# Patient Record
Sex: MALE | Race: WHITE | Employment: OTHER | URBAN - METROPOLITAN AREA
[De-identification: names, ages, dates, MRNs, and addresses within clinical notes are randomized per-mention and may not be internally consistent; named-entity substitution may affect disease eponyms.]

---

## 2017-02-03 ENCOUNTER — TELEPHONE (OUTPATIENT)
Dept: CARDIOLOGY CLINIC | Age: 62
End: 2017-02-03

## 2017-02-03 DIAGNOSIS — Z88.9 H/O SEASONAL ALLERGIES: Primary | ICD-10-CM

## 2017-02-03 RX ORDER — MONTELUKAST SODIUM 10 MG/1
TABLET ORAL
Qty: 30 TAB | Refills: 12 | Status: SHIPPED | OUTPATIENT
Start: 2017-02-03 | End: 2021-03-31

## 2017-02-03 NOTE — TELEPHONE ENCOUNTER
Curtis Beckett, NP   You 16 minutes ago (4:23 PM)                 Next week (Routing comment)         Verified patient with two identifiers. Spoke with Beverley Gomez at [employer]  informed him per Nereida Menjivar NP patient will start Singulair next week. He verbalized understanding.

## 2017-02-03 NOTE — TELEPHONE ENCOUNTER
[facility] called  wanting to clarify when patient is to start his Singulair. Please advise.

## 2017-07-09 ENCOUNTER — HOSPITAL ENCOUNTER (EMERGENCY)
Age: 62
Discharge: HOME | End: 2017-07-09
Attending: FAMILY MEDICINE

## 2017-07-09 VITALS
DIASTOLIC BLOOD PRESSURE: 79 MMHG | OXYGEN SATURATION: 95 % | TEMPERATURE: 98.9 F | SYSTOLIC BLOOD PRESSURE: 129 MMHG | RESPIRATION RATE: 20 BRPM | HEART RATE: 75 BPM | HEIGHT: 72 IN

## 2017-07-09 DIAGNOSIS — S05.02XA CORNEAL ABRASION, LEFT, INITIAL ENCOUNTER: Primary | ICD-10-CM

## 2017-07-09 RX ORDER — ERYTHROMYCIN 5 MG/G
OINTMENT OPHTHALMIC
Qty: 3.5 G | Refills: 0 | Status: SHIPPED | OUTPATIENT
Start: 2017-07-09 | End: 2017-07-16

## 2017-07-09 NOTE — DISCHARGE INSTRUCTIONS
Corneal Scratches: Care Instructions  Your Care Instructions    The cornea is the clear surface that covers the front of the eye. When a speck of dirt, a wood chip, an insect, or another object flies into your eye, it can cause a painful scratch on the cornea. Wearing contact lenses too long or rubbing your eyes can also scratch the cornea. Small scratches usually heal in a day or two. Deeper scratches may take longer. If you have had a foreign object removed from your eye or you have a corneal scratch, you will need to watch for infection and vision problems while your eye heals. Follow-up care is a key part of your treatment and safety. Be sure to make and go to all appointments, and call your doctor if you are having problems. It's also a good idea to know your test results and keep a list of the medicines you take. How can you care for yourself at home? · The doctor probably used a medicine during your exam to numb your eye. When it wears off in 30 to 60 minutes, your eye pain may come back. Take pain medicines exactly as directed. ¨ If the doctor gave you a prescription medicine for pain, take it as prescribed. ¨ If you are not taking a prescription pain medicine, ask your doctor if you can take an over-the-counter medicine. ¨ Do not take two or more pain medicines at the same time unless the doctor told you to. Many pain medicines have acetaminophen, which is Tylenol. Too much acetaminophen (Tylenol) can be harmful. · Do not rub your injured eye. Rubbing can make it worse. · Use the prescribed eyedrops or ointment as directed. Be sure the dropper or bottle tip is clean. To put in eyedrops or ointment:  ¨ Tilt your head back, and pull your lower eyelid down with one finger. ¨ Drop or squirt the medicine inside the lower lid. ¨ Close your eye for 30 to 60 seconds to let the drops or ointment move around. ¨ Do not touch the ointment or dropper tip to your eyelashes or any other surface.   · Do not use your contact lens in your hurt eye until your doctor says you can. Also, do not wear eye makeup until your eye has healed. · Do not drive if you have blurred vision. · Bright light may hurt. Sunglasses can help. · To prevent eye injuries in the future, wear safety glasses or goggles when you work with machines or tools, mow the lawn, or ride a bike or motorcycle. When should you call for help? Call your doctor now or seek immediate medical care if:  · You have signs of an eye infection, such as:  ¨ Pus or thick discharge coming from the eye. ¨ Redness or swelling around the eye. ¨ A fever. · You have new or worse eye pain. · You have vision changes. · It feels like there is something in your eye. · Light hurts your eye. Watch closely for changes in your health, and be sure to contact your doctor if:  · You do not get better as expected. Where can you learn more? Go to http://darci-ja.info/. Enter V679 in the search box to learn more about \"Corneal Scratches: Care Instructions. \"  Current as of: March 20, 2017  Content Version: 11.3  © 1651-6424 [employer]. Care instructions adapted under license by [employer] (which disclaims liability or warranty for this information). If you have questions about a medical condition or this instruction, always ask your healthcare professional. [place] [address] any warranty or liability for your use of this information.

## 2017-07-11 NOTE — UC PROVIDER NOTE
Patient is a 57 y.o. male presenting with eye injury. The history is provided by the patient. Eye Injury    This is a new problem. The current episode started yesterday. The left eye is affected. History of trauma: Using a weed eater without using gogles. Associated symptoms include eye redness and pain. Pertinent negatives include no blurred vision, no decreased vision and no fever. He has tried nothing for the symptoms. Past Medical History:   Diagnosis Date    Adverse effect of anesthesia     sleep apnea no cpap machine     Arthritis     joints    Diabetes (HCC) 1/2013    A1c 7    GERD (gastroesophageal reflux disease)     Hypertension     Ill-defined condition     Hiatial Hernia    Right bundle branch block     Unspecified sleep apnea     unable to tolerate cpap        Past Surgical History:   Procedure Laterality Date    HX CATARACT REMOVAL Bilateral     with lens implants    HX LUMBAR LAMINECTOMY      HX ORTHOPAEDIC  2/13/13    LEFT TOTAL KNEE ARTHROPLASTY         Family History   Problem Relation Age of Onset    Diabetes Mother     Cancer Mother      lymphoma    Diabetes Brother     Cancer Brother      throat    Lung Disease Father      emphysema        Social History     Social History    Marital status:      Spouse name: N/A    Number of children: N/A    Years of education: N/A     Occupational History     Other     Social History Main Topics    Smoking status: Former Smoker     Packs/day: 0.50     Years: 20.00     Types: Cigarettes     Quit date: 1/22/2011    Smokeless tobacco: Never Used    Alcohol use No    Drug use: Yes     Special: Prescription, OTC    Sexual activity: Not on file     Other Topics Concern    Not on file     Social History Narrative                ALLERGIES: Review of patient's allergies indicates no known allergies. Review of Systems   Constitutional: Negative for chills and fever. HENT: Negative for congestion.     Eyes: Positive for pain and redness. Negative for blurred vision. Vitals:    07/09/17 0839 07/09/17 0840   BP:  129/79   Pulse:  75   Resp:  20   Temp:  98.9 °F (37.2 °C)   SpO2:  95%   Height: 6' (1.829 m)        Physical Exam   Constitutional: He is oriented to person, place, and time. He appears well-developed and well-nourished. Eyes: Conjunctivae and EOM are normal.   Small abrasion in left cornea 6:00 position   Pulmonary/Chest: Effort normal.   Neurological: He is alert and oriented to person, place, and time. Psychiatric: He has a normal mood and affect. His behavior is normal. Judgment and thought content normal.   Nursing note and vitals reviewed. MDM     Differential Diagnosis; Clinical Impression; Plan:     CLINICAL IMPRESSION:  Corneal abrasion, left, initial encounter  (primary encounter diagnosis)    Plan:  1. Erythromycin ophthalmic  2.   3.   Risk of Significant Complications, Morbidity, and/or Mortality:   Presenting problems: Moderate  Diagnostic procedures: Moderate  Management options:   Moderate  Progress:   Patient progress:  Stable      Procedures

## 2019-01-22 ENCOUNTER — HOSPITAL ENCOUNTER (OUTPATIENT)
Dept: MRI IMAGING | Age: 64
Discharge: HOME | End: 2019-01-22
Attending: ORTHOPAEDIC SURGERY

## 2019-01-22 DIAGNOSIS — M75.121 COMPLETE TEAR OF RIGHT ROTATOR CUFF: ICD-10-CM

## 2019-02-05 ENCOUNTER — HOSPITAL ENCOUNTER (OUTPATIENT)
Dept: MRI IMAGING | Age: 64
Discharge: HOME | End: 2019-02-05
Attending: ORTHOPAEDIC SURGERY
Payer: MEDICARE

## 2019-02-05 PROCEDURE — 73221 MRI JOINT UPR EXTREM W/O DYE: CPT

## 2019-03-22 NOTE — PERIOP NOTES
[facility]  Ambulatory Surgery Unit  Pre-operative Instructions    Surgery/Procedure Date 03/29/19        Tentative Arrival Time TBD      1. On the day of your surgery/procedure, please report to the Ambulatory Surgery Unit Registration Desk and sign in at your designated time. The Ambulatory Surgery Unit is located in [facility] on the [facility] side of the [facility] across from the [address] [place] [address]. Please have all of your health insurance cards and a photo ID. 2. You must have someone with you to drive you home, as you should not drive a car for 24 hours following anesthesia. Please make arrangements for a responsible adult friend or family member to stay with you for at least the first 24 hours after your surgery. 3. Do not have anything to eat or drink (including water, gum, mints, coffee, juice) after 11:59 PM  03/28/19. This may not apply to medications prescribed by your physician. (Please note below the special instructions with medications to take the morning of surgery, if applicable.)    4. We recommend you do not drink any alcoholic beverages for 24 hours before and after your surgery. 5. Contact your surgeons office for instructions on the following medications: non-steroidal anti-inflammatory drugs (i.e. Advil, Aleve), vitamins, and supplements. (Some surgeons will want you to stop these medications prior to surgery and others may allow you to take them)   **If you are currently taking Plavix, Coumadin, Aspirin and/or other blood-thinning agents, contact your surgeon for instructions. ** Your surgeon will partner with the physician prescribing these medications to determine if it is safe to stop or if you need to continue taking. Please do not stop taking these medications without instructions from your surgeon.     6. In an effort to help prevent surgical site infection, we ask that you shower with an anti-bacterial soap (i.e. Dial/Safeguard, or the soap provided to you at your preadmission testing appointment) for 3 days prior to and on the morning of surgery, using a fresh towel after each shower. (Please begin this process with fresh bed linens.) Do not apply any lotions, powders, or deodorants after the shower on the day of your procedure. If applicable, please do not shave the operative site for 48 hours prior to surgery. 7. Wear comfortable clothes. Wear glasses instead of contacts. Do not bring any jewelry or money (other than copays or fees as instructed). Do not wear make-up, particularly mascara, the morning of your surgery. Do not wear nail polish, particularly if you are having foot /hand surgery. Wear your hair loose or down, no ponytails, buns, laila pins or clips. All body piercings must be removed. 8. You should understand that if you do not follow these instructions your surgery may be cancelled. If your physical condition changes (i.e. fever, cold or flu) please contact your surgeon as soon as possible. 9. It is important that you be on time. If a situation occurs where you may be late, or if you have any questions or problems, please call [phone].    10. Your surgery time may be subject to change. You will receive a phone call the day prior to surgery to confirm your arrival time. Special Instructions: Take all medications and inhalers, as prescribed, on the morning of surgery with a sip of water EXCEPT: diabetic meds         I understand a pre-operative phone call will be made to verify my surgery time. In the event that I am not available, I give permission for a message to be left on my answering service and/or with another person?       yes         ___________________      ___________________      ________________  (Signature of Patient)          (Witness)                   (Date and Time)

## 2019-03-25 ENCOUNTER — HOSPITAL ENCOUNTER (OUTPATIENT)
Dept: SURGERY | Age: 64
Setting detail: OUTPATIENT SURGERY
Discharge: HOME | End: 2019-03-25
Payer: MEDICARE

## 2019-03-25 VITALS
HEART RATE: 70 BPM | SYSTOLIC BLOOD PRESSURE: 130 MMHG | OXYGEN SATURATION: 96 % | TEMPERATURE: 98.2 F | DIASTOLIC BLOOD PRESSURE: 75 MMHG | RESPIRATION RATE: 17 BRPM

## 2019-03-25 LAB
ANION GAP SERPL CALC-SCNC: 5 MMOL/L (ref 5–15)
BUN SERPL-MCNC: 18 MG/DL (ref 6–20)
BUN/CREAT SERPL: 17 (ref 12–20)
CALCIUM SERPL-MCNC: 9.1 MG/DL (ref 8.5–10.1)
CHLORIDE SERPL-SCNC: 104 MMOL/L (ref 97–108)
CO2 SERPL-SCNC: 29 MMOL/L (ref 21–32)
CREAT SERPL-MCNC: 1.06 MG/DL (ref 0.7–1.3)
GLUCOSE SERPL-MCNC: 171 MG/DL (ref 65–100)
POTASSIUM SERPL-SCNC: 3.8 MMOL/L (ref 3.5–5.1)
SODIUM SERPL-SCNC: 138 MMOL/L (ref 136–145)

## 2019-03-25 PROCEDURE — 36415 COLL VENOUS BLD VENIPUNCTURE: CPT

## 2019-03-25 PROCEDURE — 80048 BASIC METABOLIC PNL TOTAL CA: CPT

## 2019-03-28 ENCOUNTER — ANESTHESIA EVENT (OUTPATIENT)
Dept: SURGERY | Age: 64
End: 2019-03-28
Payer: MEDICARE

## 2019-03-28 PROBLEM — M75.121 COMPLETE TEAR OF RIGHT ROTATOR CUFF: Status: ACTIVE | Noted: 2019-03-28

## 2019-03-28 NOTE — H&P
PRE- OP History and Physical                             Subjective:     Patient is a 70 y.o. male presented with a history of Patient has a traumatic right full thickness rotator cuff tear from a fall about 3 months ago onto right shoulder, has had progressively worsening pain and weakness since, no improvement with nonsurgical treatments.       I reviewed previous records, previous x-rays, MRI, findings, impression, treatment options, and plan with the patient and his wife. Treatment options discussed to include no intervention, prescription strength oral anti-inflammatories, injections, Tylenol, physical therapy, and surgical intervention.      I discussed indications, risks, benefits, and recovery of RCR with patient, emphasizing risk of retear and stiffness, and gave a RCR handout detailing the same. I reviewed risk factors for reparability and healing to include smoking, diabetes, age over 61, genetic predisposition, chronicity and size of tear, and compliance. I discussed his success rate of about 85%. I discussed indications, risks, benefits, and recovery for subacromial decompression, biceps tenodesis, and distal claviculectomy.       After discussion and answering all questions he elected to proceed with my recommendation of arthroscopic rotator cuff repair, SAD, DCE, and biceps tenodesis. .  The patient's condition has been resistant to non-operative treatment and is being admitted for surgical management of this condition.      Patient Active Problem List    Diagnosis Date Noted    Complete tear of right rotator cuff 03/28/2019    Localized primary osteoarthritis of lower leg 12/07/2016    Primary localized osteoarthritis of right knee 12/07/2016    Type II or unspecified type diabetes mellitus without mention of complication, not stated as uncontrolled 02/14/2013     Past Medical History:   Diagnosis Date    Adverse effect of anesthesia     sleep apnea no cpap machine     Arthritis     joints    Diabetes ([facility] [address] 2013    A1c 7    GERD (gastroesophageal reflux disease)     Hiatal hernia     Hypertension     Right bundle branch block     Unspecified sleep apnea     unable to tolerate cpap as of 19      Past Surgical History:   Procedure Laterality Date    HX CATARACT REMOVAL Bilateral     with lens implants    HX KNEE REPLACEMENT Bilateral 13     TOTAL KNEE ARTHROPLASTY    HX LUMBAR LAMINECTOMY        Prior to Admission medications    Medication Sig Start Date End Date Taking? Authorizing Provider   inulin (FIBER GUMMIES PO) Take 2 Tabs by mouth. Yes Provider, Historical   montelukast (SINGULAIR) 10 mg tablet May resume in two weeks. 2/3/17  Yes Kelechi Darden NP   omega-3 fatty acids-vitamin e (FISH OIL) 1,000 mg cap Take 1 Cap by mouth two (2) times a day. May resume when Coumadin complete 16  Yes Jose Miguel Ricketts NP   OXYMETAZOLINE HCL (AFRIN NASAL SPRAY NA) by Nasal route as needed. One spray  To each nostril   Yes Provider, Historical   amoxicillin (AMOXIL) 500 mg capsule Take 2,000 mg by mouth as needed. 4 hours prior to dental procedures   Yes Provider, Historical   metFORMIN (GLUCOPHAGE) 500 mg tablet Take 500 mg by mouth two (2) times daily (with meals). Indications: TYPE 2 DIABETES MELLITUS   Yes Provider, Historical   hydrochlorothiazide (HYDRODIURIL) 25 mg tablet Take 25 mg by mouth daily. Yes Provider, Historical   enalapril (VASOTEC) 5 mg tablet Take 5 mg by mouth daily. Yes Provider, Historical     No Known Allergies   Social History     Tobacco Use    Smoking status: Former Smoker     Packs/day: 0.50     Years: 20.00     Pack years: 10.00     Types: Cigarettes     Last attempt to quit: 2011     Years since quittin.1    Smokeless tobacco: Never Used   Substance Use Topics    Alcohol use:  Yes     Alcohol/week: 0.0 oz     Comment: rarely      Family History   Problem Relation Age of Onset    Diabetes Mother     Cancer Mother         lymphoma    Diabetes Brother     Cancer Brother         throat    Lung Disease Father         emphysema      Review of Systems  A comprehensive review of systems was negative except for that written in the HPI. Objective:     Patient Vitals for the past 8 hrs:   BP Temp Pulse Resp SpO2 Height Weight   03/29/19 0941 -- 97.7 °F (36.5 °C) -- -- -- -- --   03/29/19 0842 (!) 145/98 97.5 °F (36.4 °C) 73 18 99 % 5' 11\" (1.803 m) 106.2 kg (234 lb 3.2 oz)     Visit Vitals  BP (!) 145/98 (BP 1 Location: Right arm, BP Patient Position: At rest)   Pulse 73   Temp 97.7 °F (36.5 °C)   Resp 18   Ht 5' 11\" (1.803 m)   Wt 106.2 kg (234 lb 3.2 oz)   SpO2 99%   BMI 32.66 kg/m²     General:  Alert, cooperative, no distress, appears stated age. Head:  Normocephalic, without obvious abnormality, atraumatic. Eyes:  Conjunctivae/corneas clear. PERRL, EOMs intact. Ears:  Normal TMs and external ear canals both ears. Nose: Nares normal. Septum midline. Mucosa normal. No drainage or sinus tenderness. Throat: Lips, mucosa, and tongue normal. Teeth and gums normal.   Neck: Supple, symmetrical, trachea midline, no adenopathy, thyroid: no enlargement/tenderness/nodules, no carotid bruit and no JVD. Back:   Symmetric, no curvature. ROM normal. No CVA tenderness. Lungs:   Clear to auscultation bilaterally. Chest wall:  No tenderness or deformity. Heart:  Regular rate and rhythm, S1, S2, no murmur, click, rub or gallop. Abdomen:   Soft, non-tender. Bowel sounds normal. No masses,  No organomegaly.            Extremities: Extremities normal except Shoulder exam reveals palpable radial pulse in both wrists, skin intact bilaterally, sensory exam intact bilaterally.  Normal stability bilaterally.  No Yahir deformity bilaterally.  No AC joint pain to palpation bilaterally.  Range of motion right/left:  elevation 160/160, external rotation 70/70.  Positive painful arc in flexion.  Good strength with external rotation testing bilaterally.  Positive Marda Grieves' on the right.  Positive drop arm test on the right.  Mildly painful Speed sign on the right.   , atraumatic, no cyanosis or edema. Pulses: 2+ and symmetric all extremities. Skin: Skin color, texture, turgor normal. No rashes or lesions   Lymph nodes: Cervical, supraclavicular, and axillary nodes normal.   Neurologic: CNII-XII intact. Neurovascular exam intact in distal extremities        Imaging Review  I reviewed and interpreted previous x-rays of his right shoulder from 1/17/19 which show no significant glenohumeral arthritis, no fracture.       I independently reviewed and interpreted both the MRI and report of right shoulder from [employer] from 2/5/19 which shows a full thickness rotator cuff tear best seen on series 3 image 14  and  series 3 image 13.           Assessment:     Active Problems:    Complete tear of right rotator cuff (3/28/2019)        Plan:   Patient has a traumatic right full thickness rotator cuff tear from a fall about 3 months ago onto right shoulder, has had progressively worsening pain and weakness since, no improvement with nonsurgical treatments.       I reviewed previous records, previous x-rays, MRI, findings, impression, treatment options, and plan with the patient and his wife. Treatment options discussed to include no intervention, prescription strength oral anti-inflammatories, injections, Tylenol, physical therapy, and surgical intervention.      I discussed indications, risks, benefits, and recovery of RCR with patient, emphasizing risk of retear and stiffness, and gave a RCR handout detailing the same. I reviewed risk factors for reparability and healing to include smoking, diabetes, age over 61, genetic predisposition, chronicity and size of tear, and compliance. I discussed his success rate of about 85%.     I discussed indications, risks, benefits, and recovery for subacromial decompression, biceps tenodesis, and distal claviculectomy.       After discussion and answering all questions he elected to proceed with my recommendation of arthroscopic rotator cuff repair, SAD, DCE, and biceps tenodesis. Operative and non-operative treatments have been discussed with the patient including risks and benefits of each. After consideration of risks, benefits limitations to the consented procedures and alternative options for treatment, the patient has consented to surgical interventions. Questions were answered and Pre-op teaching was completed.       AVNI Sarmiento

## 2019-03-29 ENCOUNTER — ANESTHESIA (OUTPATIENT)
Dept: SURGERY | Age: 64
End: 2019-03-29
Payer: MEDICARE

## 2019-03-29 ENCOUNTER — HOSPITAL ENCOUNTER (OUTPATIENT)
Age: 64
Setting detail: OUTPATIENT SURGERY
Discharge: HOME | End: 2019-03-29
Attending: ORTHOPAEDIC SURGERY | Admitting: ORTHOPAEDIC SURGERY
Payer: MEDICARE

## 2019-03-29 VITALS
WEIGHT: 234.2 LBS | BODY MASS INDEX: 32.79 KG/M2 | TEMPERATURE: 98.2 F | SYSTOLIC BLOOD PRESSURE: 152 MMHG | HEART RATE: 67 BPM | RESPIRATION RATE: 16 BRPM | OXYGEN SATURATION: 97 % | HEIGHT: 71 IN | DIASTOLIC BLOOD PRESSURE: 82 MMHG

## 2019-03-29 LAB
GLUCOSE BLD STRIP.AUTO-MCNC: 134 MG/DL (ref 65–100)
GLUCOSE BLD STRIP.AUTO-MCNC: 151 MG/DL (ref 65–100)
SERVICE CMNT-IMP: ABNORMAL
SERVICE CMNT-IMP: ABNORMAL

## 2019-03-29 PROCEDURE — 76210000046 HC AMBSU PH II REC FIRST 0.5 HR: Performed by: ORTHOPAEDIC SURGERY

## 2019-03-29 PROCEDURE — 77030002916 HC SUT ETHLN J&J -A: Performed by: ORTHOPAEDIC SURGERY

## 2019-03-29 PROCEDURE — 76030000003 HC AMB SURG OR TIME 1.5 TO 2: Performed by: ORTHOPAEDIC SURGERY

## 2019-03-29 PROCEDURE — 77030025419 HC BLD SHV ACRMNZR LG S&N -B: Performed by: ORTHOPAEDIC SURGERY

## 2019-03-29 PROCEDURE — 77030037837: Performed by: ORTHOPAEDIC SURGERY

## 2019-03-29 PROCEDURE — 77030019908 HC STETH ESOPH SIMS -A: Performed by: NURSE ANESTHETIST, CERTIFIED REGISTERED

## 2019-03-29 PROCEDURE — 74011000250 HC RX REV CODE- 250

## 2019-03-29 PROCEDURE — 74011250636 HC RX REV CODE- 250/636: Performed by: ORTHOPAEDIC SURGERY

## 2019-03-29 PROCEDURE — 77030003598 HC NDL MULT/FIRE ARTH -C: Performed by: ORTHOPAEDIC SURGERY

## 2019-03-29 PROCEDURE — 74011250636 HC RX REV CODE- 250/636: Performed by: ANESTHESIOLOGY

## 2019-03-29 PROCEDURE — C1713 ANCHOR/SCREW BN/BN,TIS/BN: HCPCS | Performed by: ORTHOPAEDIC SURGERY

## 2019-03-29 PROCEDURE — 77030021352 HC CBL LD SYS DISP COVD -B: Performed by: ORTHOPAEDIC SURGERY

## 2019-03-29 PROCEDURE — 77030020255 HC SOL INJ LR 1000ML BG: Performed by: ORTHOPAEDIC SURGERY

## 2019-03-29 PROCEDURE — 74011250636 HC RX REV CODE- 250/636

## 2019-03-29 PROCEDURE — 77030008496 HC TBNG ARTHSC IRR S&N -B: Performed by: ORTHOPAEDIC SURGERY

## 2019-03-29 PROCEDURE — 74011250637 HC RX REV CODE- 250/637: Performed by: ORTHOPAEDIC SURGERY

## 2019-03-29 PROCEDURE — 77030012711 HC WND ARTHRO ABLT S&N -D: Performed by: ORTHOPAEDIC SURGERY

## 2019-03-29 PROCEDURE — 76060000063 HC AMB SURG ANES 1.5 TO 2 HR: Performed by: ORTHOPAEDIC SURGERY

## 2019-03-29 PROCEDURE — 82962 GLUCOSE BLOOD TEST: CPT

## 2019-03-29 PROCEDURE — 77030018835 HC SOL IRR LR ICUM -A: Performed by: ORTHOPAEDIC SURGERY

## 2019-03-29 PROCEDURE — 77030004451 HC BUR SHV S&N -B: Performed by: ORTHOPAEDIC SURGERY

## 2019-03-29 PROCEDURE — 77030008684 HC TU ET CUF COVD -B: Performed by: NURSE ANESTHETIST, CERTIFIED REGISTERED

## 2019-03-29 PROCEDURE — 76210000034 HC AMBSU PH I REC 0.5 TO 1 HR: Performed by: ORTHOPAEDIC SURGERY

## 2019-03-29 PROCEDURE — 77030026438 HC STYL ET INTUB CARD -A: Performed by: NURSE ANESTHETIST, CERTIFIED REGISTERED

## 2019-03-29 DEVICE — ANCHOR SUT L14MM DIA4.5MM PEEK W/ TWO SZ 2 FIBERWIRE CRKSCR: Type: IMPLANTABLE DEVICE | Site: SHOULDER | Status: FUNCTIONAL

## 2019-03-29 DEVICE — ANCHOR SUT L14.7MM DIA5.5MM PEEK W/ 3 SZ 2 FIBERWIRE CRKSCR: Type: IMPLANTABLE DEVICE | Site: SHOULDER | Status: FUNCTIONAL

## 2019-03-29 DEVICE — ANCHOR SUT L19.1MM DIA5.5MM PEEK FULL THRD KNOTLESS EYELET: Type: IMPLANTABLE DEVICE | Site: SHOULDER | Status: FUNCTIONAL

## 2019-03-29 RX ORDER — KETOROLAC TROMETHAMINE 30 MG/ML
INJECTION, SOLUTION INTRAMUSCULAR; INTRAVENOUS AS NEEDED
Status: DISCONTINUED | OUTPATIENT
Start: 2019-03-29 | End: 2019-03-29 | Stop reason: HOSPADM

## 2019-03-29 RX ORDER — FENTANYL CITRATE 50 UG/ML
50 INJECTION, SOLUTION INTRAMUSCULAR; INTRAVENOUS AS NEEDED
Status: DISCONTINUED | OUTPATIENT
Start: 2019-03-29 | End: 2019-03-29 | Stop reason: HOSPADM

## 2019-03-29 RX ORDER — GLYCOPYRROLATE 0.2 MG/ML
INJECTION INTRAMUSCULAR; INTRAVENOUS AS NEEDED
Status: DISCONTINUED | OUTPATIENT
Start: 2019-03-29 | End: 2019-03-29 | Stop reason: HOSPADM

## 2019-03-29 RX ORDER — SUCCINYLCHOLINE CHLORIDE 20 MG/ML
INJECTION INTRAMUSCULAR; INTRAVENOUS AS NEEDED
Status: DISCONTINUED | OUTPATIENT
Start: 2019-03-29 | End: 2019-03-29 | Stop reason: HOSPADM

## 2019-03-29 RX ORDER — HYDROMORPHONE HYDROCHLORIDE 1 MG/ML
.2-.5 INJECTION, SOLUTION INTRAMUSCULAR; INTRAVENOUS; SUBCUTANEOUS
Status: DISCONTINUED | OUTPATIENT
Start: 2019-03-29 | End: 2019-03-29 | Stop reason: HOSPADM

## 2019-03-29 RX ORDER — ONDANSETRON 2 MG/ML
INJECTION INTRAMUSCULAR; INTRAVENOUS AS NEEDED
Status: DISCONTINUED | OUTPATIENT
Start: 2019-03-29 | End: 2019-03-29 | Stop reason: HOSPADM

## 2019-03-29 RX ORDER — LABETALOL HYDROCHLORIDE 5 MG/ML
INJECTION, SOLUTION INTRAVENOUS AS NEEDED
Status: DISCONTINUED | OUTPATIENT
Start: 2019-03-29 | End: 2019-03-29 | Stop reason: HOSPADM

## 2019-03-29 RX ORDER — SODIUM CHLORIDE 0.9 % (FLUSH) 0.9 %
5-40 SYRINGE (ML) INJECTION AS NEEDED
Status: DISCONTINUED | OUTPATIENT
Start: 2019-03-29 | End: 2019-03-29 | Stop reason: HOSPADM

## 2019-03-29 RX ORDER — MIDAZOLAM HYDROCHLORIDE 1 MG/ML
INJECTION, SOLUTION INTRAMUSCULAR; INTRAVENOUS AS NEEDED
Status: DISCONTINUED | OUTPATIENT
Start: 2019-03-29 | End: 2019-03-29 | Stop reason: HOSPADM

## 2019-03-29 RX ORDER — MORPHINE SULFATE 10 MG/ML
2 INJECTION, SOLUTION INTRAMUSCULAR; INTRAVENOUS
Status: DISCONTINUED | OUTPATIENT
Start: 2019-03-29 | End: 2019-03-29 | Stop reason: HOSPADM

## 2019-03-29 RX ORDER — DIPHENHYDRAMINE HYDROCHLORIDE 50 MG/ML
12.5 INJECTION, SOLUTION INTRAMUSCULAR; INTRAVENOUS AS NEEDED
Status: DISCONTINUED | OUTPATIENT
Start: 2019-03-29 | End: 2019-03-29 | Stop reason: HOSPADM

## 2019-03-29 RX ORDER — ONDANSETRON 2 MG/ML
4 INJECTION INTRAMUSCULAR; INTRAVENOUS AS NEEDED
Status: DISCONTINUED | OUTPATIENT
Start: 2019-03-29 | End: 2019-03-29 | Stop reason: HOSPADM

## 2019-03-29 RX ORDER — ACETAMINOPHEN 10 MG/ML
INJECTION, SOLUTION INTRAVENOUS AS NEEDED
Status: DISCONTINUED | OUTPATIENT
Start: 2019-03-29 | End: 2019-03-29 | Stop reason: HOSPADM

## 2019-03-29 RX ORDER — ROPIVACAINE HYDROCHLORIDE 7.5 MG/ML
INJECTION, SOLUTION EPIDURAL; PERINEURAL AS NEEDED
Status: DISCONTINUED | OUTPATIENT
Start: 2019-03-29 | End: 2019-03-29 | Stop reason: HOSPADM

## 2019-03-29 RX ORDER — FENTANYL CITRATE 50 UG/ML
25 INJECTION, SOLUTION INTRAMUSCULAR; INTRAVENOUS
Status: DISCONTINUED | OUTPATIENT
Start: 2019-03-29 | End: 2019-03-29 | Stop reason: HOSPADM

## 2019-03-29 RX ORDER — LIDOCAINE HYDROCHLORIDE 10 MG/ML
0.1 INJECTION, SOLUTION EPIDURAL; INFILTRATION; INTRACAUDAL; PERINEURAL AS NEEDED
Status: DISCONTINUED | OUTPATIENT
Start: 2019-03-29 | End: 2019-03-29 | Stop reason: HOSPADM

## 2019-03-29 RX ORDER — PROPOFOL 10 MG/ML
INJECTION, EMULSION INTRAVENOUS AS NEEDED
Status: DISCONTINUED | OUTPATIENT
Start: 2019-03-29 | End: 2019-03-29 | Stop reason: HOSPADM

## 2019-03-29 RX ORDER — CEFAZOLIN SODIUM/WATER 2 G/20 ML
2 SYRINGE (ML) INTRAVENOUS ONCE
Status: COMPLETED | OUTPATIENT
Start: 2019-03-29 | End: 2019-03-29

## 2019-03-29 RX ORDER — FENTANYL CITRATE 50 UG/ML
INJECTION, SOLUTION INTRAMUSCULAR; INTRAVENOUS AS NEEDED
Status: DISCONTINUED | OUTPATIENT
Start: 2019-03-29 | End: 2019-03-29 | Stop reason: HOSPADM

## 2019-03-29 RX ORDER — SODIUM CHLORIDE 0.9 % (FLUSH) 0.9 %
5-40 SYRINGE (ML) INJECTION EVERY 8 HOURS
Status: DISCONTINUED | OUTPATIENT
Start: 2019-03-29 | End: 2019-03-29 | Stop reason: HOSPADM

## 2019-03-29 RX ORDER — NEOSTIGMINE METHYLSULFATE 1 MG/ML
INJECTION INTRAVENOUS AS NEEDED
Status: DISCONTINUED | OUTPATIENT
Start: 2019-03-29 | End: 2019-03-29 | Stop reason: HOSPADM

## 2019-03-29 RX ORDER — MIDAZOLAM HYDROCHLORIDE 1 MG/ML
0.5 INJECTION, SOLUTION INTRAMUSCULAR; INTRAVENOUS
Status: DISCONTINUED | OUTPATIENT
Start: 2019-03-29 | End: 2019-03-29 | Stop reason: HOSPADM

## 2019-03-29 RX ORDER — OXYCODONE HYDROCHLORIDE 5 MG/1
5 TABLET ORAL
Status: DISCONTINUED | OUTPATIENT
Start: 2019-03-29 | End: 2019-03-29 | Stop reason: HOSPADM

## 2019-03-29 RX ORDER — OXYCODONE HYDROCHLORIDE 5 MG/1
TABLET ORAL
Status: DISCONTINUED
Start: 2019-03-29 | End: 2019-03-29 | Stop reason: HOSPADM

## 2019-03-29 RX ORDER — LIDOCAINE HYDROCHLORIDE 20 MG/ML
INJECTION, SOLUTION EPIDURAL; INFILTRATION; INTRACAUDAL; PERINEURAL AS NEEDED
Status: DISCONTINUED | OUTPATIENT
Start: 2019-03-29 | End: 2019-03-29 | Stop reason: HOSPADM

## 2019-03-29 RX ORDER — SODIUM CHLORIDE, SODIUM LACTATE, POTASSIUM CHLORIDE, CALCIUM CHLORIDE 600; 310; 30; 20 MG/100ML; MG/100ML; MG/100ML; MG/100ML
25 INJECTION, SOLUTION INTRAVENOUS CONTINUOUS
Status: DISCONTINUED | OUTPATIENT
Start: 2019-03-29 | End: 2019-03-29 | Stop reason: HOSPADM

## 2019-03-29 RX ORDER — ROCURONIUM BROMIDE 10 MG/ML
INJECTION, SOLUTION INTRAVENOUS AS NEEDED
Status: DISCONTINUED | OUTPATIENT
Start: 2019-03-29 | End: 2019-03-29 | Stop reason: HOSPADM

## 2019-03-29 RX ADMIN — LIDOCAINE HYDROCHLORIDE 100 MG: 20 INJECTION, SOLUTION EPIDURAL; INFILTRATION; INTRACAUDAL; PERINEURAL at 10:45

## 2019-03-29 RX ADMIN — PROPOFOL 30 MG: 10 INJECTION, EMULSION INTRAVENOUS at 12:20

## 2019-03-29 RX ADMIN — ACETAMINOPHEN 1000 MG: 10 INJECTION, SOLUTION INTRAVENOUS at 11:09

## 2019-03-29 RX ADMIN — FENTANYL CITRATE 100 MCG: 50 INJECTION, SOLUTION INTRAMUSCULAR; INTRAVENOUS at 10:45

## 2019-03-29 RX ADMIN — NEOSTIGMINE METHYLSULFATE 3 MG: 1 INJECTION INTRAVENOUS at 12:04

## 2019-03-29 RX ADMIN — FENTANYL CITRATE 25 MCG: 50 INJECTION, SOLUTION INTRAMUSCULAR; INTRAVENOUS at 12:41

## 2019-03-29 RX ADMIN — FENTANYL CITRATE 25 MCG: 50 INJECTION, SOLUTION INTRAMUSCULAR; INTRAVENOUS at 12:44

## 2019-03-29 RX ADMIN — MEPERIDINE HYDROCHLORIDE 12.5 MG: 25 INJECTION, SOLUTION INTRAMUSCULAR; INTRAVENOUS; SUBCUTANEOUS at 12:40

## 2019-03-29 RX ADMIN — OXYCODONE HYDROCHLORIDE 5 MG: 5 TABLET ORAL at 13:26

## 2019-03-29 RX ADMIN — PROPOFOL 150 MG: 10 INJECTION, EMULSION INTRAVENOUS at 10:45

## 2019-03-29 RX ADMIN — KETOROLAC TROMETHAMINE 30 MG: 30 INJECTION, SOLUTION INTRAMUSCULAR; INTRAVENOUS at 12:16

## 2019-03-29 RX ADMIN — Medication 2 G: at 10:55

## 2019-03-29 RX ADMIN — LABETALOL HYDROCHLORIDE 10 MG: 5 INJECTION, SOLUTION INTRAVENOUS at 12:07

## 2019-03-29 RX ADMIN — SODIUM CHLORIDE, SODIUM LACTATE, POTASSIUM CHLORIDE, AND CALCIUM CHLORIDE 25 ML/HR: 600; 310; 30; 20 INJECTION, SOLUTION INTRAVENOUS at 08:46

## 2019-03-29 RX ADMIN — PROPOFOL 20 MG: 10 INJECTION, EMULSION INTRAVENOUS at 12:17

## 2019-03-29 RX ADMIN — ROCURONIUM BROMIDE 20 MG: 10 INJECTION, SOLUTION INTRAVENOUS at 10:57

## 2019-03-29 RX ADMIN — SUCCINYLCHOLINE CHLORIDE 180 MG: 20 INJECTION INTRAMUSCULAR; INTRAVENOUS at 10:45

## 2019-03-29 RX ADMIN — GLYCOPYRROLATE 0.3 MG: 0.2 INJECTION INTRAMUSCULAR; INTRAVENOUS at 12:04

## 2019-03-29 RX ADMIN — MIDAZOLAM HYDROCHLORIDE 3 MG: 1 INJECTION, SOLUTION INTRAMUSCULAR; INTRAVENOUS at 10:36

## 2019-03-29 RX ADMIN — ONDANSETRON 4 MG: 2 INJECTION INTRAMUSCULAR; INTRAVENOUS at 11:03

## 2019-03-29 RX ADMIN — ROCURONIUM BROMIDE 10 MG: 10 INJECTION, SOLUTION INTRAVENOUS at 10:45

## 2019-03-29 NOTE — ANESTHESIA PREPROCEDURE EVALUATION
Anesthetic History No history of anesthetic complications Review of Systems / Medical History Patient summary reviewed, nursing notes reviewed and pertinent labs reviewed Pulmonary Sleep apnea: No treatment Neuro/Psych Within defined limits Cardiovascular Hypertension Dysrhythmias Exercise tolerance: >4 METS 
  
GI/Hepatic/Renal 
  
GERD Endo/Other Diabetes: type 2 Obesity and arthritis Other Findings Comments: Rotator cuff tear right shoulder Physical Exam 
 
Airway Mallampati: II 
TM Distance: 4 - 6 cm Neck ROM: normal range of motion Mouth opening: Normal 
 
 Cardiovascular Rhythm: regular Rate: normal 
 
 
 
 Dental 
 
Dentition: Lower dentition intact and Upper dentition intact Pulmonary Breath sounds clear to auscultation Abdominal 
GI exam deferred Other Findings Anesthetic Plan ASA: 2 Anesthesia type: general 
 
 
 
 
Induction: Intravenous Anesthetic plan and risks discussed with: Patient

## 2019-03-29 NOTE — PERIOP NOTES
Permission received to review discharge instructions and discuss private health information with wife Radha. Patient states that wife Carmelina Gregg will be with them for at least 24 hours following today's procedure. Pt. Hooked up to isadora gallardo, turned on and given remote.

## 2019-03-29 NOTE — ANESTHESIA POSTPROCEDURE EVALUATION
Procedure(s): RIGHT SHOULDER ARTHROSCOPY, SUBACROMIAL DECOMPRESSION, ROTATOR CUFF REPAIR,  AND BICEPS TENODESIS. general 
 
Anesthesia Post Evaluation Patient location during evaluation: PACU Note status: Adequate. Level of consciousness: responsive to verbal stimuli and sleepy but conscious Pain management: satisfactory to patient Airway patency: patent Anesthetic complications: no 
Cardiovascular status: acceptable Respiratory status: acceptable Hydration status: acceptable Comments: +Post-Anesthesia Evaluation and Assessment Patient: Rayna Samaniego MRN: [id number]  SSN: [id number] YOB: 1955  Age: 70 y.o. Sex: male Cardiovascular Function/Vital Signs BP (P) 143/81 (BP 1 Location: Left arm, BP Patient Position: At rest)   Pulse (P) 67   Temp (P) 36.8 °C (98.2 °F)   Resp (P) 14   Ht 5' 11\" (1.803 m)   Wt 106.2 kg (234 lb 3.2 oz)   SpO2 (P) 97%   BMI 32.66 kg/m² Patient is status post Procedure(s): RIGHT SHOULDER ARTHROSCOPY, SUBACROMIAL DECOMPRESSION, ROTATOR CUFF REPAIR,  AND BICEPS TENODESIS. Nausea/Vomiting: Controlled. Postoperative hydration reviewed and adequate. Pain: 
Pain Scale 1: (P) Numeric (0 - 10) (03/29/19 1255) Pain Intensity 1: (P) 3 (03/29/19 1255) Managed. Neurological Status:  
Neuro (WDL): (P) Exceptions to WDL (03/29/19 1255) At baseline. Mental Status and Level of Consciousness: Arousable. Pulmonary Status:  
O2 Device: Nasal cannula (03/29/19 1240) Adequate oxygenation and airway patent. Complications related to anesthesia: None Post-anesthesia assessment completed. No concerns. Signed By: Kasi Rodriges MD  
 3/29/2019 Post anesthesia nausea and vomiting:  controlled Vitals Value Taken Time /142 3/29/2019 12:40 PM  
Temp 37 °C (98.6 °F) 3/29/2019 12:30 PM  
Pulse 75 3/29/2019 12:40 PM  
Resp 15 3/29/2019 12:40 PM  
SpO2 99 % 3/29/2019 12:40 PM

## 2019-03-29 NOTE — DISCHARGE INSTRUCTIONS
>>>You received an IV form of Tylenol 1000mg (Ofirmev) during your surgery, you may take tylenol (or pain medication containing Tylenol or Acetaminophen) in 6 hours at 3:00pm.<<<        Rotator Cuff Repair  Post-Op Instructions  Chetan Rockwell M.D.  [phone]    Sling: You will use your sling for five (5) weeks. You may remove your arm from the sling for showers. You may also remove your arm from the sling and let your arm hang down so your elbow doesn't get too stiff. You are encouraged to perform hand, wrist and elbow range of motion, arm dangles, and shoulder blade pinches at least 5 times per day. These exercises will help to decrease swelling and stiffness. I will teach your how to do dangles and shoulder blade pinches starting right after your surgery. Swelling and bruising is common after surgery. You may also notice swelling in you hand, if you do, adjust your sling so the hand is slightly above the elbow, you may squeeze a ball like a stress ball and you can do some bicep curls without weight. These exercises will help with the swelling. The essential point is that your are NOT allowed to actively lift your elbow away from your side (under its own power) for the first five (5) weeks. Dressing: Your dressing will be left in place for 1-2 days. You may notice bloody drainage on the dressing. That is fine. You will remove the dressing two (2) days after the surgery and cover the incisions with circular band-aids. Shower with band-aids on, then remove and replace band-aids after showers. Sleeping:  Patients are generally more comfortable sleeping in a reclining chair or with some pillows propped behind the shoulder. You can wear your sling when sleeping, or you may remove the sling and just slide a bed pillow up underneath your arm and sleep like that. Some difficulty with sleeping is common for 2-3 weeks after surgery. Therapy:  Arrangements will be made at your post-op appointment. Your Physical Therapist will progress your activity appropriately. Medications: You should resume your daily medication for other medical conditions the day after surgery. You will go home with pain medication prescription, Oxycodone. If you have an adverse reaction to the pain medicine, please call [phone]. DO NOT  Wait until you are in a lot of pain before taking the medication. It takes the medication 30-45 minutes to take effect. -DO NOT take NSAIDs (Advil, Aleve, Motrin, Ibuprofen, etc.) of the first month. NSAIDs are reported to delay tendon healing. Take Tylenol or Acetaminophen instead. No more than 4000 mg daily. The use of narcotics can lead to constipation. A high fiber diet and lots of fluids can prevent this occurring. Over the counter laxatives such as Dulcolax, Milk of Magnesia, and Magnesium Citrate can be used as directed on the label. We recommend taking both Miralax and Pericolace to help with constipation. If a refill of medication is needed, please call the office during regular business hours, Monday through Friday 8:00 am to 5:00 pm.  Dr. Cynthia Webber may not be readily available to sign a prescription so it is important to call ahead. Refills will not be made after hours, so please plan ahead. We also cannot guarantee a same day prescription. Driving: DO NOT drive while taking narcotic medication. It is okay to drive in your sling, just follow same rules, no lifting elbow away from your side. Return to school/work: This will depend on your job requirements and level of activity. If you work at a desk job or in a supervisory role, you may return as early as 3-4 days after surgery. Average return to regular activities is 4-6 months post-op. Follow up: You will be given an appointment card for your follow-up appointment at our [facility] office. At that time your sutures will be removed and physical therapy will be arranged.         If unexpected problems, emergencies or other issues occur and you need to speak with our office, please call. A physician is on call 24 hours a day, 7 days a week for emergencies and may be reached through the answering service by calling the regular office number [phone]. Ifrah Scanlon M.D.       TO PREVENT AN INFECTION      1. [address] YOUR HANDS     To prevent infection, good handwashing is the most important thing you or your caregiver can do.  Wash your hands with soap and water or use the hand  we gave you before you touch any wounds. 2. SHOWER     Use the antibacterial soap we gave you when you take a shower.  Shower with this soap until your wounds are healed.  To reach all areas of your body, you may need someone to help you.  Dont forget to clean your belly button with every shower. 3.  USE CLEAN SHEETS     Use freshly cleaned sheets on your bed after surgery.  To keep the surgery site clean, do not allow pets to sleep with you while your wound is still healing. 4. STOP SMOKING     Stop smoking, or at least cut back on smoking     Smoking slows your healing. 5.  CONTROL YOUR BLOOD SUGAR     High blood sugars slow wound healing. If you are diabetic, control your blood sugar levels before and after your surgery. TAKE NARCOTIC PAIN MEDICATIONS WITH FOOD     Narcotics tend to be constipating, we suggest taking a stool softener such as Colace or Miralax (follow package instructions). DO NOT DRIVE WHILE TAKING NARCOTIC PAIN MEDICATIONS. DO NOT TAKE SLEEPING MEDICATIONS OR ANTIANXIETY MEDICATIONS WHILE TAKING NARCOTIC PAIN MEDICATIONS,  ESPECIALLY THE NIGHT OF ANESTHESIA! CPAP PATIENTS BE SURE TO WEAR MACHINE WHENEVER NAPPING OR SLEEPING!     DISCHARGE SUMMARY from Nurse    The following personal items collected during your admission are returned to you:   Dental Appliance: Dental Appliances: None  Vision: Visual Aid: Glasses(pacu) WIFE HAS  Hearing Aid:    Jewelry: Jewelry: None  Clothing: Clothing: Footwear, Pants, Shirt, Socks, Undergarments, With patient  Other Valuables: Other Valuables: Cell Phone(given to wife)  Valuables sent to safe:        PATIENT INSTRUCTIONS:    After General Anesthesia or Intravenous Sedation, for 24 hours or while taking prescription Narcotics:        Someone should be with you for the next 24 hours. For your own safety, a responsible adult must drive you home. · Limit your activities  · Recommended activity: Rest today, up with assistance today. Do not climb stairs or shower unattended for the next 24 hours. · Please start with a soft bland diet and advance as tolerated (no nausea) to regular diet. · If you have a sore throat you should try the following: fluids, warm salt water gargles, or throat lozenges. If it does not improve after several days please follow up with your primary physician. · Do not drive and operate hazardous machinery  · Do not make important personal or business decisions  · Do  not drink alcoholic beverages  · If you have not urinated within 8 hours after discharge, please contact your surgeon on call. Report the following to your surgeon:  · Excessive pain, swelling, redness or odor of or around the surgical area  · Temperature over 100.5  · Nausea and vomiting lasting longer than 4 hours or if unable to take medications  · Any signs of decreased circulation or nerve impairment to extremity: change in color, persistent  numbness, tingling, coldness or increase pain      · You will receive a Post Operative Call from one of the Recovery Room Nurses on the day after your surgery to check on you. It is very important for us to know how you are recovering after your surgery. If you have an issue or need to speak with someone, please call your surgeon, do not wait for the post operative call.     · You may receive an e-mail or letter in the mail from [employer] regarding your experience with us in the Ambulatory Surgery Unit. Your feedback is valuable to us and we appreciate your participation in the survey. · If the above instructions are not adequate or you are having problems after your surgery, call the physician at their office number. · We wish you a speedy recovery ? What to do at Home:      *  Please give a list of your current medications to your Primary Care Provider. *  Please update this list whenever your medications are discontinued, doses are      changed, or new medications (including over-the-counter products) are added. *  Please carry medication information at all times in case of emergency situations. These are general instructions for a healthy lifestyle:    No smoking/ No tobacco products/ Avoid exposure to second hand smoke    Surgeon General's Warning:  Quitting smoking now greatly reduces serious risk to your health. Obesity, smoking, and sedentary lifestyle greatly increases your risk for illness    A healthy diet, regular physical exercise & weight monitoring are important for maintaining a healthy lifestyle    You may be retaining fluid if you have a history of heart failure or if you experience any of the following symptoms:  Weight gain of 3 pounds or more overnight or 5 pounds in a week, increased swelling in our hands or feet or shortness of breath while lying flat in bed. Please call your doctor as soon as you notice any of these symptoms; do not wait until your next office visit. Recognize signs and symptoms of STROKE:    B - Balance  E - Eyes    F-  Face looks uneven  A-  Arms unable to move or move even  S-  Speech slurred or non-existent  T-  Time-call 911 as soon as signs and symptoms begin-DO NOT go       Back to bed or wait to see if you get better-TIME IS BRAIN. If you have not received your influenza and/or pneumococcal vaccine, please follow up with your primary care physician.     The discharge information has been reviewed with the patient and caregiver. The patient and caregiver verbalized understanding.

## 2019-03-29 NOTE — PERIOP NOTES
Pt got real groggy with pain medication but states that feeling better. Wife brought back to review information and assist snack for 1st PO oxycodone. 1335 Pt. Alert. Requesting discharge. Denies chill and states pain tolerable. Discharge instructions reviewed with caregiver and patient. Allowed and answered questions. Tolerating PO fluids. Both state ready for discharge. 1345 Discharged to car without incident in sling-no increase in drainage on dressing below tape-just pink same as entry to PACU wife knows to apply ice during day and call if turns red and saturates.

## 2019-03-30 NOTE — OP NOTES
[address]  OPERATIVE REPORT    Name:  Huang Brown  MR#:  [id number]  :  1955  ACCOUNT #:  [de-identified]  DATE OF SERVICE:  2019      PREOPERATIVE DIAGNOSES:  Right shoulder supraspinatus rotator cuff tear, long head biceps tearing, and outlet impingement. POSTOPERATIVE DIAGNOSES:  Right shoulder supraspinatus rotator cuff tear, long head biceps tearing, and outlet impingement. PROCEDURE PERFORMED:  Right shoulder arthroscopic rotator cuff repair, arthroscopic long head biceps tenodesis, and arthroscopic subacromial decompression. SURGEON:  Irene Carrillo MD    ASSISTANT:  AVNI Cm    ANESTHESIA:  General endotracheal.    COMPLICATIONS:  None. SPECIMENS REMOVED:  Proximal biceps. IMPLANTS:  Arthrex bioabsorbable suture anchors. ESTIMATED BLOOD LOSS:  10 mL. INDICATION:  Traumatic right shoulder cuff tear with associated biceps tear and impingement, comes in for surgical treatment. Complex surgical procedure requiring an assistant for patient positioning, for wound closure, for what is essentially a four-handed operation with an assistant required to either hold the scope to assist with implant placement, suture management, hold the scope while the sutures are tied and knots cut, and one is used. PROCEDURE:  The patient was brought into the operating room theater; given airway, IV antibiotics, preoperative interscalene block; rolled over into the right side up lateral position. Beanbag exsufflated down side. Axillary roll placed down side. Peroneal nerve padded. Neck placed in neutral extension position on a folded pillow. Right shoulder prepped and draped, put in 10 pounds of traction, 30 degrees of abduction, 20 degrees of forward flexion. Established anterior and posterior arthroscopic portals, reviewed the joint in its entirety. Glenohumeral joint shows no significant osteoarthritis, mild capsulitis, biceps is torn about 60%. This was photographed. It was then tenotomized for later tenodesis. Cuff is supraspinatus, torn, 2 cm wide just behind the biceps. We went into the subacromial space, did a thorough bursectomy. The patient has outlet impingement. So we did a lateral half of the CA ligament release and a subacromial decompression using cutting block technique, creating type 1 acromion, removing greater than 1 cm of bone mostly up the lateral facet. The cuff tear was an L-shaped tear of the supraspinatus posteriorly based. We roughened up the footprint, put in medial row and lateral row anchors, one medial row, two lateral row, horizontal mattress sutures exiting 15 mm medial to the lateral edge on the medial row, lateral row is five simple sutures with side-to-side on the posterior part of the L-shaped tear. All tied arthroscopically. Long head biceps screw opened, biceps retrieved, tenodesed into the groove by roughing the groove, putting a double loaded 4.5 Arthrex anchor with a Boileau suture and a simple suture tenodesing the biceps into the groove about 2 cm down the groove. Proximal biceps excised and removed. Photographs taken. Copiously irrigated, closed the portals, took the patient to recovery room in stable condition.       Rasta Lombardi MD      [id number]  D:  03/29/2019 20:19  T:  03/30/2019 1:20  JOB #:  [id number]

## 2019-04-26 ENCOUNTER — HOSPITAL ENCOUNTER (OUTPATIENT)
Dept: GENERAL RADIOLOGY | Age: 64
Discharge: HOME | End: 2019-04-26
Payer: MEDICARE

## 2019-04-26 DIAGNOSIS — M06.09 RHEUMATOID ARTHRITIS OF MULTIPLE SITES WITHOUT RHEUMATOID FACTOR (HCC): ICD-10-CM

## 2019-04-26 PROCEDURE — 73130 X-RAY EXAM OF HAND: CPT

## 2019-04-26 PROCEDURE — 71046 X-RAY EXAM CHEST 2 VIEWS: CPT

## 2021-03-22 DIAGNOSIS — I10 ESSENTIAL HYPERTENSION: Primary | ICD-10-CM

## 2021-03-30 NOTE — PROGRESS NOTES
[address] [facility], [place], [address] [facility]  [phone]     Subjective:      Shivam Spain is a 77 y.o. male is here for new patient consulatation. He has pmhx HTN, DM, RBBB, CAMERON not on CPAP therapy. Remote hx smoking, rarely drinks, denies illegal drug use. No family hx CAD but mother hx stroke. Reports bilateral leg swelling few weeks ago and high bp reading, states he missed his Hctz dose that day. He followed up with pcp who obtained labs. Today, leg swelling has resolved. He does have occasional leg cramps that awaken him from sleep but doesn't bother him that much. No other complaints. He is able to perform physical activity without exertional symptoms. The patient denies chest pain/ shortness of breath, orthopnea, PND, palpitations, syncope, or presyncope.        Patient Active Problem List    Diagnosis Date Noted    Complete tear of right rotator cuff 03/28/2019    Localized primary osteoarthritis of lower leg 12/07/2016    Primary localized osteoarthritis of right knee 12/07/2016    Type II or unspecified type diabetes mellitus without mention of complication, not stated as uncontrolled 02/14/2013      Ivon Licea MD  Past Medical History:   Diagnosis Date    Adverse effect of anesthesia     sleep apnea no cpap machine     Arthritis     joints    Diabetes ([address] 1/2013    A1c 7    GERD (gastroesophageal reflux disease)     Hiatal hernia     Hypertension     Right bundle branch block     Unspecified sleep apnea     unable to tolerate cpap as of 03/22/19      Past Surgical History:   Procedure Laterality Date    HX CATARACT REMOVAL Bilateral     with lens implants    HX KNEE REPLACEMENT Bilateral 2/13/13     TOTAL KNEE ARTHROPLASTY    HX LUMBAR LAMINECTOMY       No Known Allergies   Family History   Problem Relation Age of Onset    Diabetes Mother     Cancer Mother         lymphoma    Diabetes Brother     Cancer Brother         throat    Lung Disease Father         emphysema      Social History     Socioeconomic History    Marital status:      Spouse name: Not on file    Number of children: Not on file    Years of education: Not on file    Highest education level: Not on file   Occupational History    Occupation:      Employer: OTHER   Social Needs    Financial resource strain: Not on file    Food insecurity     Worry: Not on file     Inability: Not on file    Transportation needs     Medical: Not on file     Non-medical: Not on file   Tobacco Use    Smoking status: Former Smoker     Packs/day: 0.50     Years: 20.00     Pack years: 10.00     Types: Cigarettes     Quit date: 1/22/2011     Years since quitting: 10.1    Smokeless tobacco: Never Used   Substance and Sexual Activity    Alcohol use: Yes     Frequency: Monthly or less     Comment: rarely    Drug use: Yes     Types: Prescription, OTC    Sexual activity: Not on file   Lifestyle    Physical activity     Days per week: Not on file     Minutes per session: Not on file    Stress: Not on file   Relationships    Social connections     Talks on phone: Not on file     Gets together: Not on file     Attends [employer] service: Not on file     Active member of club or organization: Not on file     Attends meetings of clubs or organizations: Not on file     Relationship status: Not on file    Intimate partner violence     Fear of current or ex partner: Not on file     Emotionally abused: Not on file     Physically abused: Not on file     Forced sexual activity: Not on file   Other Topics Concern    Not on file   Social History Narrative    Not on file      Current Outpatient Medications   Medication Sig    diclofenac EC (VOLTAREN) 75 mg EC tablet Take 75 mg by mouth two (2) times a day.  diphenhydramine HCl (BENADRYL ALLERGY PO) Take 25 mg by mouth daily.  cetirizine (ZYRTEC) 10 mg tablet Take 10 mg by mouth daily.     atorvastatin (LIPITOR) 10 mg tablet Take 1 Tab by mouth daily.  omega-3 fatty acids-vitamin e (FISH OIL) 1,000 mg cap Take 1 Cap by mouth two (2) times a day. May resume when Coumadin complete    amoxicillin (AMOXIL) 500 mg capsule Take 2,000 mg by mouth as needed. 4 hours prior to dental procedures    metFORMIN (GLUCOPHAGE) 500 mg tablet Take 500 mg by mouth two (2) times daily (with meals). Indications: TYPE 2 DIABETES MELLITUS    hydrochlorothiazide (HYDRODIURIL) 25 mg tablet Take 25 mg by mouth daily.  enalapril (VASOTEC) 5 mg tablet Take 5 mg by mouth daily. No current facility-administered medications for this visit. Review of Symptoms:  11 systems reviewed, negative other than as stated in the HPI    Physical ExamPhysical Exam:    Vitals:    03/31/21 0852 03/31/21 0912   BP: 110/80 120/88   Pulse: 81    Resp: 16    SpO2: 97%    Weight: 225 lb 14.4 oz (102.5 kg)    Height: 5' 11\" (1.803 m)      Body mass index is 31.51 kg/m². General PE  Gen:  NAD  Mental Status - Alert. General Appearance - Not in acute distress. HEENT:  PERRL, no carotid bruits or JVD  Chest and Lung Exam   Inspection: Accessory muscles - No use of accessory muscles in breathing. Auscultation:   Breath sounds: - Normal.   Cardiovascular   Inspection: Jugular vein - Bilateral - Inspection Normal.   Palpation/Percussion:   Apical Impulse: - Normal.   Auscultation: Rhythm - Regular. Heart Sounds - S1 WNL and S2 WNL. No S3 or S4. Murmurs & Other Heart Sounds: Auscultation of the heart reveals - No Murmurs. Peripheral Vascular   Upper Extremity: Inspection - Bilateral - No Cyanotic nailbeds or Digital clubbing. Lower Extremity:   Palpation: Edema - Bilateral - No edema. Abdomen:   Soft, non-tender, bowel sounds are active.   Neuro: A&O times 3, CN and motor grossly WNL    Labs:   No results found for: CHOL, CHOLX, CHLST, CHOLV, 681571, HDL, HDLP, LDL, LDLC, DLDLP, TGLX, TRIGL, TRIGP, CHHD, CHHDX  No results found for: CPK, CPKX, CPX  Lab Results   Component Value Date/Time    Sodium 138 03/25/2019 09:14 AM    Potassium 3.8 03/25/2019 09:14 AM    Chloride 104 03/25/2019 09:14 AM    CO2 29 03/25/2019 09:14 AM    Anion gap 5 03/25/2019 09:14 AM    Glucose 171 (H) 03/25/2019 09:14 AM    BUN 18 03/25/2019 09:14 AM    Creatinine 1.06 03/25/2019 09:14 AM    BUN/Creatinine ratio 17 03/25/2019 09:14 AM    GFR est AA >60 03/25/2019 09:14 AM    GFR est non-AA >60 03/25/2019 09:14 AM    Calcium 9.1 03/25/2019 09:14 AM    Bilirubin, total 0.6 11/29/2016 09:42 AM    Alk. phosphatase 83 11/29/2016 09:42 AM    Protein, total 7.8 11/29/2016 09:42 AM    Albumin 4.0 11/29/2016 09:42 AM    Globulin 3.8 11/29/2016 09:42 AM    A-G Ratio 1.1 11/29/2016 09:42 AM    ALT (SGPT) 78 11/29/2016 09:42 AM       EKG:  SR       Assessment:     Assessment:      1. Leg swelling    2. Essential hypertension    3. Controlled type 2 diabetes mellitus with complication, without long-term current use of insulin ([facility] [address]    4. Mixed hyperlipidemia        Orders Placed This Encounter    LIPID PANEL     Standing Status:   Future     Standing Expiration Date:   5/51/4637    METABOLIC PANEL, COMPREHENSIVE     Standing Status:   Future     Standing Expiration Date:   3/31/2022    CK     Standing Status:   Future     Standing Expiration Date:   3/31/2022    AMB POC EKG ROUTINE W/ 12 LEADS, INTER & REP     Order Specific Question:   Reason for Exam:     Answer:   routine    diclofenac EC (VOLTAREN) 75 mg EC tablet     Sig: Take 75 mg by mouth two (2) times a day.  diphenhydramine HCl (BENADRYL ALLERGY PO)     Sig: Take 25 mg by mouth daily.  DISCONTD: ibuprofen (AdviL) 200 mg tablet     Sig: Take 200 mg by mouth every eight (8) hours as needed for Pain.  cetirizine (ZYRTEC) 10 mg tablet     Sig: Take 10 mg by mouth daily.  atorvastatin (LIPITOR) 10 mg tablet     Sig: Take 1 Tab by mouth daily. Dispense:  90 Tab     Refill:  1        Plan:     1.  Bilateral leg swelling  Prelim echo reading today shows EF 55-60% mild aortic dilatation   in 3/2021  Continue leg elevation, recommend compression stockings  Will let us know if symptom recurs, will obtain venous reflux study then    2. Essential hypertension  110/80 with repeat, continue enalapril, Hctz  Cr 1. 42 in 3/2021    3. HLD  3/2021 LDL 92, . Will start atorvastatin 10 mg daily  Repeat labs in 3 mos      4. Controlled type 2 diabetes mellitus with complication, without long-term current use of insulin ([facility])  A1C is 6.6 in 3/2021  On Metformin    5. CAMERON   Intolerant of CPAP but is now going to do trial again      Continue current care and f/u in       Aniya Mahmood NP       [facility]    3/31/2021         Patient seen, examined by me personally. Plan discussed as detailed. Agree with note as outlined by  NP with modifications as noted. My independent physical exam reveals : Physical Exam   Constitutional: He is oriented to person, place, and time. He appears well-developed and well-nourished. HENT:   Head: Normocephalic. Eyes: Conjunctivae are normal.   Neck: Normal range of motion. Cardiovascular: Normal heart sounds. Exam reveals no friction rub. No murmur heard. Pulmonary/Chest: Effort normal and breath sounds normal. He has no wheezes. Musculoskeletal:         General: No edema. Neurological: He is alert and oriented to person, place, and time. Skin: Skin is warm and dry. Psychiatric: He has a normal mood and affect. Nursing note and vitals reviewed. Echo normal, mild AV thickening. No effusion. BP well controlled. Continue HCTZ. Start statin, goal LDL <70. Labs by pcp. No additional findings noted. Agree with plan as outlined above with modifications as noted.      Paulett Siemens, MD

## 2021-03-31 ENCOUNTER — OFFICE VISIT (OUTPATIENT)
Dept: CARDIOLOGY CLINIC | Age: 66
End: 2021-03-31
Payer: MEDICARE

## 2021-03-31 ENCOUNTER — ANCILLARY PROCEDURE (OUTPATIENT)
Dept: CARDIOLOGY CLINIC | Age: 66
End: 2021-03-31
Payer: MEDICARE

## 2021-03-31 VITALS
BODY MASS INDEX: 31.63 KG/M2 | HEIGHT: 71 IN | WEIGHT: 225.9 LBS | HEART RATE: 81 BPM | SYSTOLIC BLOOD PRESSURE: 120 MMHG | DIASTOLIC BLOOD PRESSURE: 88 MMHG | OXYGEN SATURATION: 97 % | RESPIRATION RATE: 16 BRPM

## 2021-03-31 VITALS
HEIGHT: 71 IN | WEIGHT: 234 LBS | DIASTOLIC BLOOD PRESSURE: 82 MMHG | SYSTOLIC BLOOD PRESSURE: 152 MMHG | BODY MASS INDEX: 32.76 KG/M2

## 2021-03-31 DIAGNOSIS — E11.8 CONTROLLED TYPE 2 DIABETES MELLITUS WITH COMPLICATION, WITHOUT LONG-TERM CURRENT USE OF INSULIN (HCC): ICD-10-CM

## 2021-03-31 DIAGNOSIS — I10 ESSENTIAL HYPERTENSION: ICD-10-CM

## 2021-03-31 DIAGNOSIS — E78.2 MIXED HYPERLIPIDEMIA: ICD-10-CM

## 2021-03-31 DIAGNOSIS — M79.89 LEG SWELLING: Primary | ICD-10-CM

## 2021-03-31 PROCEDURE — G8427 DOCREV CUR MEDS BY ELIG CLIN: HCPCS | Performed by: INTERNAL MEDICINE

## 2021-03-31 PROCEDURE — G0463 HOSPITAL OUTPT CLINIC VISIT: HCPCS | Performed by: INTERNAL MEDICINE

## 2021-03-31 PROCEDURE — 3017F COLORECTAL CA SCREEN DOC REV: CPT | Performed by: INTERNAL MEDICINE

## 2021-03-31 PROCEDURE — G8419 CALC BMI OUT NRM PARAM NOF/U: HCPCS | Performed by: INTERNAL MEDICINE

## 2021-03-31 PROCEDURE — 1101F PT FALLS ASSESS-DOCD LE1/YR: CPT | Performed by: INTERNAL MEDICINE

## 2021-03-31 PROCEDURE — G8752 SYS BP LESS 140: HCPCS | Performed by: INTERNAL MEDICINE

## 2021-03-31 PROCEDURE — 93005 ELECTROCARDIOGRAM TRACING: CPT | Performed by: INTERNAL MEDICINE

## 2021-03-31 PROCEDURE — 3046F HEMOGLOBIN A1C LEVEL >9.0%: CPT | Performed by: INTERNAL MEDICINE

## 2021-03-31 PROCEDURE — 93306 TTE W/DOPPLER COMPLETE: CPT | Performed by: INTERNAL MEDICINE

## 2021-03-31 PROCEDURE — G8536 NO DOC ELDER MAL SCRN: HCPCS | Performed by: INTERNAL MEDICINE

## 2021-03-31 PROCEDURE — 93010 ELECTROCARDIOGRAM REPORT: CPT | Performed by: INTERNAL MEDICINE

## 2021-03-31 PROCEDURE — G8754 DIAS BP LESS 90: HCPCS | Performed by: INTERNAL MEDICINE

## 2021-03-31 PROCEDURE — 2022F DILAT RTA XM EVC RTNOPTHY: CPT | Performed by: INTERNAL MEDICINE

## 2021-03-31 PROCEDURE — 99204 OFFICE O/P NEW MOD 45 MIN: CPT | Performed by: INTERNAL MEDICINE

## 2021-03-31 PROCEDURE — G8510 SCR DEP NEG, NO PLAN REQD: HCPCS | Performed by: INTERNAL MEDICINE

## 2021-03-31 RX ORDER — DICLOFENAC SODIUM 75 MG/1
75 TABLET, DELAYED RELEASE ORAL 2 TIMES DAILY
COMMUNITY
Start: 2020-07-02

## 2021-03-31 RX ORDER — IBUPROFEN 200 MG
200 TABLET ORAL
COMMUNITY
End: 2021-03-31

## 2021-03-31 RX ORDER — CETIRIZINE HCL 10 MG
10 TABLET ORAL DAILY
COMMUNITY

## 2021-03-31 RX ORDER — ATORVASTATIN CALCIUM 10 MG/1
10 TABLET, FILM COATED ORAL DAILY
Qty: 90 TAB | Refills: 1 | Status: SHIPPED | OUTPATIENT
Start: 2021-03-31

## 2021-03-31 NOTE — LETTER
3/31/2021 Patient: Cat Up YOB: 1955 Date of Visit: 3/31/2021 Gareth Mendoza [address] [place] [address] [address] [employer] Dear Benja Rubio MD, Thank you for referring Mr. Cat Up to [address] [place] [address] for evaluation. My notes for this consultation are attached. If you have questions, please do not hesitate to call me. I look forward to following your patient along with you. Sincerely, Noemy Patel MD

## 2021-03-31 NOTE — PROGRESS NOTES
1. Have you been to the ER, urgent care clinic since your last visit? Hospitalized since your last visit? Seen at [address] [place] [id number] in [place] 2 weeks ago. 2. Have you seen or consulted any other health care providers outside of the [address] [place] [address] since your last visit? Include any pap smears or colon screening. Seen PCP.         Chief Complaint   Patient presents with    New Patient     echo done- swelling in feet and legs and BP elevated

## 2021-04-01 LAB
ECHO AO ASC DIAM: 4.02 CM
ECHO AO ROOT DIAM: 3.58 CM
ECHO AV AREA PEAK VELOCITY: 2.33 CM2
ECHO AV AREA/BSA PEAK VELOCITY: 1 CM2/M2
ECHO AV PEAK GRADIENT: 12.83 MMHG
ECHO AV PEAK VELOCITY: 179.11 CM/S
ECHO EST RA PRESSURE: 3 MMHG
ECHO LA AREA 4C: 18.28 CM2
ECHO LA MAJOR AXIS: 3.56 CM
ECHO LA MINOR AXIS: 1.58 CM
ECHO LA VOL 2C: 51.86 ML (ref 18–58)
ECHO LA VOL 4C: 48.01 ML (ref 18–58)
ECHO LA VOL BP: 58.26 ML (ref 18–58)
ECHO LA VOL/BSA BIPLANE: 25.84 ML/M2 (ref 16–28)
ECHO LA VOLUME INDEX A2C: 23 ML/M2 (ref 16–28)
ECHO LA VOLUME INDEX A4C: 21.3 ML/M2 (ref 16–28)
ECHO LV E' LATERAL VELOCITY: 7.41 CM/S
ECHO LV INTERNAL DIMENSION DIASTOLIC: 4.98 CM (ref 4.2–5.9)
ECHO LV INTERNAL DIMENSION SYSTOLIC: 2.93 CM
ECHO LV ISOVOLUMETRIC RELAXATION TIME (IVRT): 72.31 MS
ECHO LV IVSD: 1.1 CM (ref 0.6–1)
ECHO LV MASS 2D: 211 G (ref 88–224)
ECHO LV MASS INDEX 2D: 93.6 G/M2 (ref 49–115)
ECHO LV POSTERIOR WALL DIASTOLIC: 1.14 CM (ref 0.6–1)
ECHO LVOT DIAM: 2.28 CM
ECHO LVOT PEAK GRADIENT: 4.17 MMHG
ECHO LVOT PEAK VELOCITY: 102.09 CM/S
ECHO MV "A" WAVE DURATION: 142.72 MS
ECHO MV A VELOCITY: 105.31 CM/S
ECHO MV E DECELERATION TIME (DT): 217.3 MS
ECHO MV E VELOCITY: 88.02 CM/S
ECHO MV E/A RATIO: 0.84
ECHO MV E/E' LATERAL: 11.88
ECHO RIGHT VENTRICULAR SYSTOLIC PRESSURE (RVSP): 19.77 MMHG
ECHO RV TAPSE: 2.7 CM (ref 1.5–2)
ECHO TV REGURGITANT MAX VELOCITY: 204.78 CM/S
ECHO TV REGURGITANT PEAK GRADIENT: 16.77 MMHG
LA VOL DISK BP: 51.78 ML (ref 18–58)

## 2022-03-19 PROBLEM — M75.121 COMPLETE TEAR OF RIGHT ROTATOR CUFF: Status: ACTIVE | Noted: 2019-03-28

## 2023-05-11 RX ORDER — AMOXICILLIN 500 MG/1
CAPSULE ORAL PRN
COMMUNITY

## 2023-05-11 RX ORDER — DICLOFENAC SODIUM 75 MG/1
TABLET, DELAYED RELEASE ORAL 2 TIMES DAILY
COMMUNITY
Start: 2020-07-02

## 2023-05-11 RX ORDER — HYDROCHLOROTHIAZIDE 25 MG/1
25 TABLET ORAL DAILY
COMMUNITY

## 2023-05-11 RX ORDER — ATORVASTATIN CALCIUM 10 MG/1
TABLET, FILM COATED ORAL DAILY
COMMUNITY
Start: 2021-03-31

## 2023-05-11 RX ORDER — ENALAPRIL MALEATE 5 MG/1
5 TABLET ORAL DAILY
COMMUNITY

## 2023-05-11 RX ORDER — CETIRIZINE HYDROCHLORIDE 10 MG/1
10 TABLET ORAL DAILY
COMMUNITY

## (undated) DEVICE — SUT ETHLN 3-0 18IN PS2 BLK --

## (undated) DEVICE — (D)PREP SKN CHLRAPRP APPL 26ML -- CONVERT TO ITEM 371833

## (undated) DEVICE — INFECTION CONTROL KIT SYS

## (undated) DEVICE — 4.5 MM FULL RADIUS STRAIGHT                                    BLADES, POWER/EP-1, YELLOW, PACKAGED                                    6 PER BOX, STERILE: Brand: DYONICS

## (undated) DEVICE — CANNULA ARTHSCP L5CM ID8MM DBL DAM 1 PC MOLD LO PROF FLNG

## (undated) DEVICE — SHOULDER W/POUCH II-LF: Brand: MEDLINE INDUSTRIES, INC.

## (undated) DEVICE — CONTINU-FLO SOLUTION SET, 2 INJECTION SITES, MALE LUER LOCK ADAPTER WITH RETRACTABLE COLLAR, LARGE BORE STOPCOCK WITH ROTATING MALE LUER LOCK EXTENSION SET, 2 INJECTION SITES, MALE LUER LOCK ADAPTER WITH RETRACTABLE COLLAR: Brand: INTERLINK/CONTINU-FLO

## (undated) DEVICE — SUPER TURBOVAC 90 INTEGRATED CABLE WAND ICW: Brand: COBLATION

## (undated) DEVICE — GAUZE SPONGES,12 PLY: Brand: CURITY

## (undated) DEVICE — KENDALL DL ECG CABLE AND LEAD WIRE SYSTEM, 3-LEAD, SINGLE PATIENT USE: Brand: KENDALL

## (undated) DEVICE — DYONICS 25 INFLOW TUBE SET, 3 PER BOX

## (undated) DEVICE — CATHETER IV 20GA L1.25IN FEP STR HUB TEF INTROCAN SFTY

## (undated) DEVICE — MEDI-VAC NON-CONDUCTIVE SUCTION TUBING: Brand: CARDINAL HEALTH

## (undated) DEVICE — SOLUTION IRRIG 3000ML LAC R FLX CONT

## (undated) DEVICE — SOLUTION LACTATED RINGERS INJECTION USP

## (undated) DEVICE — 4-PORT MANIFOLD: Brand: NEPTUNE 2

## (undated) DEVICE — SHOULDER SUSPENSION KIT 6 PER BOX

## (undated) DEVICE — BURR ACROMIONIZER 4.0 LG SUCT WDO DSPL: Brand: DYONICS / INCISOR

## (undated) DEVICE — 3M™ TEGADERM™ TRANSPARENT FILM DRESSING FRAME STYLE, 1624W, 2-3/8 IN X 2-3/4 IN (6 CM X 7 CM), 100/CT 4CT/CASE: Brand: 3M™ TEGADERM™

## (undated) DEVICE — NEEDLE SUT PASS FOR ROT CUF LABRAL REP MULTFI SCORPION

## (undated) DEVICE — STERILE POLYISOPRENE POWDER-FREE SURGICAL GLOVES: Brand: PROTEXIS

## (undated) DEVICE — CANNULA ARTHSCP L4CM DIA8MM PASSPRT BTTN